# Patient Record
Sex: FEMALE | Race: BLACK OR AFRICAN AMERICAN
[De-identification: names, ages, dates, MRNs, and addresses within clinical notes are randomized per-mention and may not be internally consistent; named-entity substitution may affect disease eponyms.]

---

## 2018-11-30 ENCOUNTER — HOSPITAL ENCOUNTER (EMERGENCY)
Dept: HOSPITAL 80 - CED | Age: 23
LOS: 1 days | Discharge: HOME | End: 2018-12-01
Payer: OTHER GOVERNMENT

## 2018-11-30 DIAGNOSIS — R10.31: Primary | ICD-10-CM

## 2018-11-30 NOTE — EDPHY
H & P


Stated Complaint: c/o RLQ x2 hrs


Time Seen by Provider: 18 22:50


HPI/ROS: 





This patient reports abrupt onset of right lower quadrant pain while at rest 2 

hr prior to arrival.  This occurred at home when she describes the nature the 

pain as sharp, waxing and waning peak intensity 8/10.  The pain worsens but 

with movement.  She notes no other exacerbating factors.  She did not take any 

medications for the pain prior to arrival.  She drove herself here by private 

car.  She felt well prior to the onset of the pain.  She has never had this 

pain before.  She describes as feeling similar to menstral  Cramps but more 

severe and more abrupt in onset.





ROS:  Constitutional:  No fevers.  No fatigue.


HEENT:  No complaints new line pulmonary:  No shortness of breath or cough


Cardiovascular:  No lightheadedness


GI:  No nausea or vomiting.  Normal bowel movements.  Last meal was dinner.


:  No dysuria, frequency or urgency.  Last menstrual period was last week but 

she only had 1 day of menses.  No vaginal discharge.


Musculoskeletal:  No back pain


Integumentary:  No skin rash or diaphoresis


10 point review of symptoms is performed and otherwise negative with exception 

of pertinent positives and negatives listed in HPI and ROS





Source: Patient


Exam Limitations: No limitations





- Personal History


LMP (Females 10-55): 8-14 Days Ago


Current Tetanus Diphtheria and Acellular Pertussis (TDAP): No





- Medical/Surgical History


PMH: 








Hx Asthma: No


Hx Chronic Respiratory Disease: No


Hx Diabetes: No


Hx Cardiac Disease: No


Hx Renal Disease: No


Hx Cirrhosis: No


Hx Alcoholism: No


Hx HIV/AIDS: No


Hx Splenectomy or Spleen Trauma: No


Other PMH: denies





- Social History


Smoking Status: Never smoked


Alcohol Use: None


Drug Use: None


Additional Social History: 





She is a student and lives alone with her dog





- Physical Exam


Exam: 





General Appearance:  Alert, no distress.


Eyes:  Pupils equal and round no pallor or injection.


ENT, Mouth:  Mucous membranes moist.


Respiratory:  There are no retractions, lungs are clear to auscultation.


Cardiovascular:  Regular rate and rhythm.


Gastrointestinal:  Normoactive, soft, moderate right lower quadrant tenderness 

with no guarding or rebound appreciated.


Back:  No CVA tenderness


Neurological:  GCS 15


Skin:  Warm and dry, no rashes.


Musculoskeletal:  Neck is supple nontender.


Extremities are symmetrical, full range of motion.


Psychiatric:  Mood and affect are normal





DIFFERENTIAL DIAGNOSIS:  After history and physical exam differential diagnosis 

was considered for ruptured ovarian cyst, ovarian torsion, ureteral stone, 

appendicitis, mesenteric adenitis, constipation, UTI


Constitutional: 


 Initial Vital Signs











Temperature (C)  36.6 C   18 22:42


 


Heart Rate  66   18 22:42


 


Respiratory Rate  18   18 22:42


 


Blood Pressure  106/66   18 22:42


 


O2 Sat (%)  98   18 22:42








 











O2 Delivery Mode               Room Air














Allergies/Adverse Reactions: 


 





No Known Allergies Allergy (Unverified 18 22:41)


 








Home Medications: 














 Medication  Instructions  Recorded


 


Hyoscyamine Sulfate [Levsin, 0.125 - 0.25 mg SL Q6 PRN #20 tab 18





Hyomax-Sl 0.125 mg (*)]  














Medical Decision Making





- Diagnostics


Imaging Results: 


 Imaging Impressions





Abdomen Ultrasound  18 23:06


Impression:


1. Nondiagnostic ultrasound right lower quadrant with bowel gas visualized. The 

appendix could not be identified.











Imaging: Discussed imaging studies w/ On call Radiologist


ED Course/Re-evaluation: 





IV normal saline bolus





Toradol IV and Levsin sublingual with improvement





Discussion:  Patient with right lower quadrant abdominal pain is abrupt in 

onset with benign-appearing right lower quadrant ultrasound in terms of her 

ovary.  Appendix is not visualized.  Given her normal white count, lack of GI 

symptoms atypical onset I do not think this patient has acute appendicitis.  

However she understands that our workup tonight is preliminary and that she 

should return to the emergency department should she have any significant 

worsening of symptoms despite the treatment plan of Levsin ibuprofen.











- Data Points


Laboratory Results: 


 











  18





  23:17


 


POC Sodium  144 mEq/L mEq/L





   (135-145) 


 


POC Potassium  3.2 mEq/L L mEq/L





   (3.3-5.0) 


 


POC Chloride  106.0 mEq/L mEq/L





   () 


 


POC Total CO2  24 mEq/L mEq/L





   (22-31) 


 


POC BUN  9 mg/dL mg/dL





   (7-23) 


 


POC Creatinine  0.7 mg/dL mg/dL





   (0.6-1.0) 


 


POC Glucose  93 mg/dL mg/dL





   () 


 


POC Calcium  9.6 mg/dL mg/dL





   (8.5-10.4) 











Medications Given: 


 








Discontinued Medications





Sodium Chloride (Ns)  1,000 mls @ 0 mls/hr IV EDNOW ONE; Wide Open


   PRN Reason: Protocol


   Stop: 18 22:40


   Last Admin: 18 23:07 Dose:  1,000 mls


Ketorolac Tromethamine (Toradol)  15 mg IVP EDNOW ONE


   Stop: 18 23:08


   Last Admin: 18 23:24 Dose:  15 mg





Point of Care Test Results: 


 CBC











CBC Collection Date            18


 


CBC Collection Time            23:15


 


WBC                            7.7


 


RBC                            4.69


 


HGB                            14.8


 


HCT                            44.1


 


PLT                            139


 


Neut #                         3.8


 


Neut                           49.9


 


LYMPH #                        3.6


 


LYMPH                          46.4


 


Other WBC #                    0.3


 


Other WBC                      3.7


 


MCV                            94.0











 Chemistry











  18





  23:17


 


POC Sodium  144 mEq/L mEq/L





   (135-145) 


 


POC Potassium  3.2 mEq/L L mEq/L





   (3.3-5.0) 


 


POC Chloride  106.0 mEq/L mEq/L





   () 


 


POC Total CO2  24 mEq/L mEq/L





   (22-31) 


 


POC BUN  9 mg/dL mg/dL





   (7-23) 


 


POC Creatinine  0.7 mg/dL mg/dL





   (0.6-1.0) 


 


POC Glucose  93 mg/dL mg/dL





   () 


 


POC Calcium  9.6 mg/dL mg/dL





   (8.5-10.4) 








 Urine Pregnancy











Collection Date                18


 


Collection Time                23:12


 


HCG Results                    Negative











 Urine Dip











Collection Date                18


 


Collection Time                23:11


 


Specific Gravity (1.002-1.030) 1.030


 


PH (5.0-7.5)                   6.0


 


Leukocytes (Negative)          Negative


 


Nitrites (Negative)            Negative


 


Protein (Negative)             Negative


 


Glucose (Negative)             Negative


 


Ketones (Negative)             Trace


 


Urobilnogen (0.2-1.0 EU)       0.2


 


Bilirubin (Negative)           Negative


 


Blood (Negative)               Negative

















Departure





- Departure


Disposition: Home, Routine, Self-Care


Clinical Impression: 


 RLQ abdominal pain





Condition: Good


Instructions:  Acute Abdominal Pain (ED)


Additional Instructions: 


Diagnosis:  Acute abdominal pain





-right lower quadrant








Plan:  Drink plenty fluids





Ibuprofen Tylenol for discomfort





Levsin in addition if needed





Return to the emergency department for any significant worsening despite the 

treatment plan

## 2018-12-01 VITALS — SYSTOLIC BLOOD PRESSURE: 112 MMHG | DIASTOLIC BLOOD PRESSURE: 72 MMHG
